# Patient Record
Sex: MALE | Race: WHITE | NOT HISPANIC OR LATINO | Employment: FULL TIME | URBAN - METROPOLITAN AREA
[De-identification: names, ages, dates, MRNs, and addresses within clinical notes are randomized per-mention and may not be internally consistent; named-entity substitution may affect disease eponyms.]

---

## 2023-02-20 LAB
PROSTATE SPECIFIC AG (NG/ML) IN SER/PLAS: 0.68 NG/ML (ref 0–4)
THYROTROPIN (MIU/L) IN SER/PLAS BY DETECTION LIMIT <= 0.05 MIU/L: 3.31 MIU/L (ref 0.44–3.98)

## 2023-10-18 DIAGNOSIS — J01.01 ACUTE RECURRENT MAXILLARY SINUSITIS: Primary | ICD-10-CM

## 2023-10-18 RX ORDER — AMOXICILLIN AND CLAVULANATE POTASSIUM 875; 125 MG/1; MG/1
875 TABLET, FILM COATED ORAL 2 TIMES DAILY
COMMUNITY
Start: 2023-10-18 | End: 2023-10-18 | Stop reason: SDUPTHER

## 2023-10-18 RX ORDER — AMOXICILLIN AND CLAVULANATE POTASSIUM 875; 125 MG/1; MG/1
875 TABLET, FILM COATED ORAL 2 TIMES DAILY
Qty: 20 TABLET | Refills: 0 | Status: SHIPPED | OUTPATIENT
Start: 2023-10-18 | End: 2023-10-28

## 2023-12-28 ENCOUNTER — OFFICE VISIT (OUTPATIENT)
Dept: PRIMARY CARE | Facility: CLINIC | Age: 58
End: 2023-12-28
Payer: COMMERCIAL

## 2023-12-28 VITALS
TEMPERATURE: 96.3 F | HEIGHT: 73 IN | DIASTOLIC BLOOD PRESSURE: 82 MMHG | WEIGHT: 272 LBS | BODY MASS INDEX: 36.05 KG/M2 | SYSTOLIC BLOOD PRESSURE: 144 MMHG

## 2023-12-28 DIAGNOSIS — I10 HYPERTENSION, UNSPECIFIED TYPE: ICD-10-CM

## 2023-12-28 DIAGNOSIS — I68.0 CEREBRAL AMYLOID ANGIOPATHY (MULTI): ICD-10-CM

## 2023-12-28 DIAGNOSIS — D64.9 ANEMIA, UNSPECIFIED TYPE: Primary | ICD-10-CM

## 2023-12-28 DIAGNOSIS — F68.8 PERSONALITY CHANGE IN ADULT: ICD-10-CM

## 2023-12-28 DIAGNOSIS — G45.9 TIA (TRANSIENT ISCHEMIC ATTACK): ICD-10-CM

## 2023-12-28 DIAGNOSIS — E85.4 CEREBRAL AMYLOID ANGIOPATHY (MULTI): ICD-10-CM

## 2023-12-28 DIAGNOSIS — E78.2 MIXED HYPERLIPIDEMIA: ICD-10-CM

## 2023-12-28 DIAGNOSIS — Z12.11 SCREENING FOR MALIGNANT NEOPLASM OF COLON: ICD-10-CM

## 2023-12-28 DIAGNOSIS — I61.8: ICD-10-CM

## 2023-12-28 LAB
APPEARANCE UR: CLEAR
BILIRUB UR QL STRIP: NEGATIVE
COLOR UR: YELLOW
GLUCOSE UR STRIP-MCNC: NEGATIVE MG/DL
HGB UR QL STRIP: NEGATIVE
KETONES UR STRIP-MCNC: NEGATIVE MG/DL
LEUKOCYTE ESTERASE UR QL STRIP: NEGATIVE
NITRITE UR QL STRIP: NEGATIVE
PH UR STRIP: 6 [PH]
PROT UR STRIP-MCNC: NEGATIVE MG/DL
SP GR UR STRIP.AUTO: 1.02
UROBILINOGEN UR STRIP-ACNC: 0.2 E.U./DL

## 2023-12-28 PROCEDURE — 80061 LIPID PANEL: CPT | Performed by: INTERNAL MEDICINE

## 2023-12-28 PROCEDURE — 80053 COMPREHEN METABOLIC PANEL: CPT | Performed by: INTERNAL MEDICINE

## 2023-12-28 PROCEDURE — 81003 URINALYSIS AUTO W/O SCOPE: CPT | Performed by: INTERNAL MEDICINE

## 2023-12-28 PROCEDURE — 85025 COMPLETE CBC W/AUTO DIFF WBC: CPT | Performed by: INTERNAL MEDICINE

## 2023-12-28 PROCEDURE — 3077F SYST BP >= 140 MM HG: CPT | Performed by: INTERNAL MEDICINE

## 2023-12-28 PROCEDURE — 1036F TOBACCO NON-USER: CPT | Performed by: INTERNAL MEDICINE

## 2023-12-28 PROCEDURE — 99214 OFFICE O/P EST MOD 30 MIN: CPT | Performed by: INTERNAL MEDICINE

## 2023-12-28 PROCEDURE — 3079F DIAST BP 80-89 MM HG: CPT | Performed by: INTERNAL MEDICINE

## 2023-12-28 ASSESSMENT — ENCOUNTER SYMPTOMS
DEPRESSION: 0
LOSS OF SENSATION IN FEET: 0
OCCASIONAL FEELINGS OF UNSTEADINESS: 0

## 2023-12-28 ASSESSMENT — PAIN SCALES - GENERAL: PAINLEVEL: 0-NO PAIN

## 2023-12-28 NOTE — PROGRESS NOTES
"Subjective   Patient ID: Ramone Escobar is a 58 y.o. male who presents for Hypertension, Hypothyroidism, and Hyperlipidemia.    HPI   58 years old male comes in to see me today accompanied by his wife.  The reason of their visit is on Kal[place] weekend he had some changes in his behavior and personality.  He was not himself.  He sat down and stopped talking much to his wife and to his friends.  She asked him what is going on and he was staring without answers.  During the weekend he called me with his hypertension which nicely came down.  He had no explanation to his behavior on Kalyn weekend.  He is feeling better today.  He does have a known history of autoimmune cerebral amyloid angiopathy and microhemorrhages worse on the left side on MRI done August 15, 2023.  His weight 272 pounds.  We had a quite a discussion with him and the wife.  My final advice was to follow-up with the neurologist at the [facility] concerning these new symptoms they happen on Kal[place] weekend.  They agreed to that.  Review of Systems  12 system reviewed today and 12 systems are negative.  Objective   /82 (BP Location: Left arm, Patient Position: Sitting, BP Cuff Size: Large adult)   Temp 35.7 °C (96.3 °F) (Temporal)   Ht 1.854 m (6' 1\")   Wt 123 kg (272 lb)   BMI 35.89 kg/m²     Physical Exam  Alert and oriented in no acute distress.  Nonicteric sclera no jaundice.  Face symmetrical cranial nerves intact.  Neck supple no masses no lymph node thyromegaly or jugular venous distention.  Lungs clear no rales wheezing or crackles.  Heart normal S1 and S2 regular rhythm.  Abdomen benign neurologically intact.  Skin abnormality on the left lower extremity.  Discussed weight loss and discussed activity every day 3-4 times per week he should be on the treadmill or walking outside.  We also advised to follow-up with the neurologist.  No aspirin or nonsteroidal recommended.  Antidepressant?  To clear it with psychiatry or with " neuro.  Blood test was done colonoscopy was recommended and referral done.  Lubrication on the skin of the left lower leg.  Weight loss and exercise increase activity.  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

## 2023-12-30 ENCOUNTER — DOCUMENTATION (OUTPATIENT)
Dept: PRIMARY CARE | Facility: CLINIC | Age: 58
End: 2023-12-30
Payer: COMMERCIAL

## 2023-12-30 NOTE — PROGRESS NOTES
I called Mr. Ramone Pena and discussed his lipid panel and other lab results.  Lipid panel reveals to be up quite a bit.  Not sure if he is on atorvastatin 40 mg a day used to be on the rosuvastatin.  I called him and talk to her about that.  He needs no refills.  He agreed that I called his wife Susi and discussed the same thing with her.  I was informed that he went to [place] yesterday to talk to the nurse practitioner who works with neurology and she is to call them back after discussing his case with Dr. Bo cardoza, for a possibility of an MRI and to see him sooner than in July 2024.  He may need to be on steroid.

## 2024-02-09 ENCOUNTER — OFFICE VISIT (OUTPATIENT)
Dept: PRIMARY CARE | Facility: CLINIC | Age: 59
End: 2024-02-09
Payer: COMMERCIAL

## 2024-02-09 VITALS
HEART RATE: 84 BPM | SYSTOLIC BLOOD PRESSURE: 105 MMHG | WEIGHT: 258 LBS | TEMPERATURE: 97.2 F | OXYGEN SATURATION: 96 % | DIASTOLIC BLOOD PRESSURE: 72 MMHG | BODY MASS INDEX: 34.04 KG/M2

## 2024-02-09 DIAGNOSIS — T50.905A HYPERGLYCEMIA, DRUG-INDUCED: ICD-10-CM

## 2024-02-09 DIAGNOSIS — R53.83 FATIGUE, UNSPECIFIED TYPE: ICD-10-CM

## 2024-02-09 DIAGNOSIS — R73.9 HYPERGLYCEMIA: Primary | ICD-10-CM

## 2024-02-09 DIAGNOSIS — R73.9 HYPERGLYCEMIA, DRUG-INDUCED: ICD-10-CM

## 2024-02-09 LAB — POC FINGERSTICK BLOOD GLUCOSE: 104 MG/DL (ref 70–100)

## 2024-02-09 PROCEDURE — 1036F TOBACCO NON-USER: CPT | Performed by: INTERNAL MEDICINE

## 2024-02-09 PROCEDURE — 99214 OFFICE O/P EST MOD 30 MIN: CPT | Performed by: INTERNAL MEDICINE

## 2024-02-09 PROCEDURE — 82962 GLUCOSE BLOOD TEST: CPT | Performed by: INTERNAL MEDICINE

## 2024-02-09 PROCEDURE — 84443 ASSAY THYROID STIM HORMONE: CPT | Performed by: INTERNAL MEDICINE

## 2024-02-09 RX ORDER — OMEPRAZOLE 40 MG/1
40 CAPSULE, DELAYED RELEASE ORAL
COMMUNITY
Start: 2024-01-08 | End: 2024-04-07

## 2024-02-09 RX ORDER — CHLORTHALIDONE 25 MG/1
TABLET ORAL
COMMUNITY
Start: 2023-12-26

## 2024-02-09 RX ORDER — METFORMIN HYDROCHLORIDE 500 MG/1
500 TABLET ORAL
Qty: 180 TABLET | Refills: 3 | Status: SHIPPED | OUTPATIENT
Start: 2024-02-09

## 2024-02-09 RX ORDER — PREDNISONE 20 MG/1
TABLET ORAL
COMMUNITY
Start: 2024-01-22

## 2024-02-09 RX ORDER — LANCETS
EACH MISCELLANEOUS
Qty: 200 EACH | Refills: 11 | Status: SHIPPED | OUTPATIENT
Start: 2024-02-09

## 2024-02-09 RX ORDER — ATORVASTATIN CALCIUM 40 MG/1
TABLET, FILM COATED ORAL
COMMUNITY

## 2024-02-09 RX ORDER — CARVEDILOL 12.5 MG/1
1 TABLET ORAL
COMMUNITY
Start: 2024-01-08

## 2024-02-09 RX ORDER — BLOOD-GLUCOSE METER
1 EACH MISCELLANEOUS 2 TIMES DAILY
Qty: 200 STRIP | Refills: 11 | Status: SHIPPED | OUTPATIENT
Start: 2024-02-09 | End: 2025-02-08

## 2024-02-09 RX ORDER — INSULIN LISPRO 100 [IU]/ML
2 INJECTION, SOLUTION INTRAVENOUS; SUBCUTANEOUS
Qty: 21.6 ML | Refills: 0 | Status: SHIPPED | OUTPATIENT
Start: 2024-02-09 | End: 2025-02-08

## 2024-02-09 RX ORDER — SULFAMETHOXAZOLE AND TRIMETHOPRIM 800; 160 MG/1; MG/1
1 TABLET ORAL
COMMUNITY
Start: 2024-01-08 | End: 2024-04-07

## 2024-02-09 RX ORDER — METFORMIN HYDROCHLORIDE 500 MG/1
500 TABLET ORAL
COMMUNITY
Start: 2024-01-08 | End: 2024-02-09 | Stop reason: SDUPTHER

## 2024-02-09 RX ORDER — VALSARTAN 320 MG/1
TABLET ORAL
COMMUNITY
Start: 2021-11-17

## 2024-02-09 ASSESSMENT — ENCOUNTER SYMPTOMS
LOSS OF SENSATION IN FEET: 0
DEPRESSION: 0
OCCASIONAL FEELINGS OF UNSTEADINESS: 0

## 2024-02-09 ASSESSMENT — PATIENT HEALTH QUESTIONNAIRE - PHQ9
2. FEELING DOWN, DEPRESSED OR HOPELESS: NOT AT ALL
SUM OF ALL RESPONSES TO PHQ9 QUESTIONS 1 AND 2: 0
1. LITTLE INTEREST OR PLEASURE IN DOING THINGS: NOT AT ALL

## 2024-02-09 NOTE — PROGRESS NOTES
Subjective   Patient ID: Ramone Escobar is a 58 y.o. male who presents for Hospital Follow-up (New medication).    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  Since he has been started on high-dose prednisone.  His blood sugar has been elevated.  Started also on metformin 500 mg twice a day by the [facility] and his neurologist Dr Lobo  his neurologist  Dr. Lewis is also following up on his condition.  He is blood sugar today none fasting was 104.  We discussed it with him and the family.  Meaning the wife.  I started him on Accu-Cheks to be doing it at least twice a day before meals fasting and dinnertime and putting on the sliding scale with lispro coverage depending on his blood sugar.  A scale with treatment was provided to the wife.  Refill metformin was done.  Please send all to [facility] in [place] [id number].  We showed him how to check his blood sugar One Touch.  Review of Systems  12 system reviewed 12 systems are negative.  He is on prednisone 60 mg/day.  Need to be tapered for 6 months.  Objective   /72 (BP Location: Left arm, Patient Position: Sitting, BP Cuff Size: Large adult)   Pulse 84   Temp 36.2 °C (97.2 °F) (Temporal)   Wt 117 kg (258 lb)   SpO2 96%   BMI 34.04 kg/m²     Physical Exam  Alert oriented in no distress nonicteric sclera or jaundice.  Face symmetrical cranial nerves intact.  His weight is 258.  Was 272.  Neck supple no masses no lymph node thyromegaly or jugular venous distention.  Lungs clear no rales wheezing or crackles.  Heart normal S1 and S2 regular rhythm.  Abdomen benign nontender no masses no organomegaly.  Neurologically intact.  While visiting in the exam room a picture fell off the wall and did not injure the patient or wife thank God.  No findings of any injury.  Assessment/Plan   Diagnoses and all orders for this visit:  Hyperglycemia  -     POCT fingerstick  glucose manually resulted  -     blood sugar diagnostic (OneTouch Verio test strips) strip; 1 strip 2 times a day.  -     lancets misc; Check 2 times a day.  DISPENSE ONE TOUCH VERIO LANCETS  Fatigue, unspecified type  -     Thyroid Stimulating Hormone  Hyperglycemia, drug-induced  -     metFORMIN (Glucophage) 500 mg tablet; Take 1 tablet (500 mg) by mouth 2 times a day with meals.  -     insulin lispro (HumaLOG U-100 Insulin) 100 unit/mL injection; Inject 0.02 mL (2 Units) under the skin 3 times a day with meals. Take as directed per insulin instructions.

## 2024-02-12 ENCOUNTER — TELEPHONE (OUTPATIENT)
Dept: PRIMARY CARE | Facility: CLINIC | Age: 59
End: 2024-02-12
Payer: COMMERCIAL

## 2024-02-12 NOTE — TELEPHONE ENCOUNTER
----- Message from Edison Bianchi MD sent at 2/10/2024  2:39 PM EST -----  Regarding: ?????  Normal thyroid,   How is blood sugar ?

## 2024-03-04 ENCOUNTER — OFFICE VISIT (OUTPATIENT)
Dept: PRIMARY CARE | Facility: CLINIC | Age: 59
End: 2024-03-04
Payer: COMMERCIAL

## 2024-03-04 ENCOUNTER — HOSPITAL ENCOUNTER (OUTPATIENT)
Dept: RADIOLOGY | Facility: CLINIC | Age: 59
Discharge: HOME | End: 2024-03-04
Payer: COMMERCIAL

## 2024-03-04 VITALS
TEMPERATURE: 97.2 F | DIASTOLIC BLOOD PRESSURE: 74 MMHG | HEART RATE: 72 BPM | WEIGHT: 265 LBS | BODY MASS INDEX: 34.96 KG/M2 | SYSTOLIC BLOOD PRESSURE: 127 MMHG | OXYGEN SATURATION: 94 %

## 2024-03-04 DIAGNOSIS — S92.351A CLOSED DISPLACED FRACTURE OF FIFTH METATARSAL BONE OF RIGHT FOOT, INITIAL ENCOUNTER: ICD-10-CM

## 2024-03-04 DIAGNOSIS — S92.351A CLOSED DISPLACED FRACTURE OF FIFTH METATARSAL BONE OF RIGHT FOOT, INITIAL ENCOUNTER: Primary | ICD-10-CM

## 2024-03-04 PROCEDURE — 73630 X-RAY EXAM OF FOOT: CPT | Mod: RT

## 2024-03-04 PROCEDURE — 1036F TOBACCO NON-USER: CPT | Performed by: INTERNAL MEDICINE

## 2024-03-04 PROCEDURE — 99214 OFFICE O/P EST MOD 30 MIN: CPT | Performed by: INTERNAL MEDICINE

## 2024-03-04 PROCEDURE — 73630 X-RAY EXAM OF FOOT: CPT | Mod: RIGHT SIDE | Performed by: RADIOLOGY

## 2024-03-04 ASSESSMENT — PATIENT HEALTH QUESTIONNAIRE - PHQ9
2. FEELING DOWN, DEPRESSED OR HOPELESS: NOT AT ALL
1. LITTLE INTEREST OR PLEASURE IN DOING THINGS: NOT AT ALL
SUM OF ALL RESPONSES TO PHQ9 QUESTIONS 1 AND 2: 0

## 2024-03-04 ASSESSMENT — ENCOUNTER SYMPTOMS
LOSS OF SENSATION IN FEET: 0
DEPRESSION: 0
OCCASIONAL FEELINGS OF UNSTEADINESS: 0

## 2024-03-04 NOTE — PROGRESS NOTES
Subjective   Patient ID: Ramone Escobar is a 58 y.o. male who presents for Foot Injury (Right foot lateral injury, some busing in the toes, a little swelling. X2 days).    HPI   58 years old male comes in to see me today complaining of pain on his right foot at the level of the fifth metatarsal proximal part.  He does not while dancing with his wife on Saturday.  There is no swelling or edema.  No ecchymosis.  He moves his foot with normal range of motion.  Pain when pressing on the proximal part of the fifth metatarsal.  Review of Systems  12 system review 12 system negative.  Objective   /74 (BP Location: Right arm, Patient Position: Sitting, BP Cuff Size: Large adult)   Pulse 72   Temp 36.2 °C (97.2 °F) (Temporal)   Wt 120 kg (265 lb)   SpO2 94%   BMI 34.96 kg/m²     Physical Exam  Alert oriented in no distress.  Nonicteric sclera no jaundice.  Face symmetrical cranial nerves intact.  Normal blood pressure.  Gaining weight 7 pounds up to 265.  Examination of the right foot.  Revealed pain when pressing on the proximal part of the right metatarsal bone.  No swelling at the ankle normal range of motion and no neurovascular deficit.  The rest of the exam within normal limits.    Advised to have an x-ray done just to make sure rule out fracture meanwhile use heat half hour on every couple hours only when you are awake.  Keep the foot PRESSURE and off weight.  Tylenol or Advil for pain.  Assessment/Plan   Diagnoses and all orders for this visit:  Closed displaced fracture of fifth metatarsal bone of right foot, initial encounter  -     XR foot right 1-2 views; Future

## 2024-03-06 ENCOUNTER — TELEPHONE (OUTPATIENT)
Dept: PRIMARY CARE | Facility: CLINIC | Age: 59
End: 2024-03-06
Payer: COMMERCIAL

## 2024-03-06 NOTE — TELEPHONE ENCOUNTER
I called the patient and left a message telling him the x-ray of his foot is negative did not show any fracture or dislocation or tissue swelling.  Again advised him to use heating pad and take Tylenol or Advil for pain and to wrap his foot with an Ace bandage or may use a boot if it is painful.  Call me back if needed or text me with any question

## 2025-07-16 DIAGNOSIS — J01.00 ACUTE MAXILLARY SINUSITIS, RECURRENCE NOT SPECIFIED: Primary | ICD-10-CM

## 2025-07-16 RX ORDER — AZITHROMYCIN 500 MG/1
500 TABLET, FILM COATED ORAL DAILY
Qty: 5 TABLET | Refills: 0 | Status: SHIPPED | OUTPATIENT
Start: 2025-07-16 | End: 2025-07-18 | Stop reason: SDUPTHER

## 2025-07-18 DIAGNOSIS — J01.00 ACUTE MAXILLARY SINUSITIS, RECURRENCE NOT SPECIFIED: ICD-10-CM

## 2025-07-18 RX ORDER — AZITHROMYCIN 500 MG/1
500 TABLET, FILM COATED ORAL DAILY
Qty: 5 TABLET | Refills: 0 | Status: SHIPPED | OUTPATIENT
Start: 2025-07-18 | End: 2025-07-23